# Patient Record
Sex: MALE | Race: WHITE | Employment: OTHER | URBAN - METROPOLITAN AREA
[De-identification: names, ages, dates, MRNs, and addresses within clinical notes are randomized per-mention and may not be internally consistent; named-entity substitution may affect disease eponyms.]

---

## 2018-01-29 ENCOUNTER — TRANSFERRED RECORDS (OUTPATIENT)
Dept: HEALTH INFORMATION MANAGEMENT | Facility: CLINIC | Age: 58
End: 2018-01-29

## 2018-03-08 ENCOUNTER — TRANSFERRED RECORDS (OUTPATIENT)
Dept: HEALTH INFORMATION MANAGEMENT | Facility: CLINIC | Age: 58
End: 2018-03-08

## 2018-04-23 DIAGNOSIS — J44.9 COPD (CHRONIC OBSTRUCTIVE PULMONARY DISEASE) (H): Primary | ICD-10-CM

## 2018-05-22 NOTE — TELEPHONE ENCOUNTER
FUTURE VISIT INFORMATION      FUTURE VISIT INFORMATION:    Date: 6.7.18    Time: 3:00 Pm    Location: [facility]  REFERRAL INFORMATION:    Referring provider:  Shelly Lennox    Referring providers clinic:  [facility]    Reason for visit/diagnosis : COPD, consult for possible volume lung reduction    RECORDS REQUESTED FROM:       Clinic name Comments Records Status Imaging Status   Formerly [facility] ([facility])  Received PACS                                   RECORDS STATUS      RECORDS RECEIVED FROM: [facility]   DATE RECEIVED: 6.7.18   NOTES STATUS DETAILS   OFFICE NOTE from referring provider Received 4.10.18 Shelley Lennox  3.1.18   OFFICE NOTE from other specialist N/A    DISCHARGE SUMMARY from hospital Received [facility]  1.16.18-1.19.18   DISCHARGE REPORT from the ER N/A    OPERATIVE REPORT N/A    MEDICATION LIST Received    IMAGING  (NEED IMAGES AND REPORTS)     CT SCAN In process, requested image 3.8.18 [facility]   CHEST XRAY (CXR) In process, requested image 1.29.18  Scheduled for 6.7.18   TESTS     PULMONARY FUNCTION TESTING (PFT) In process 2.27.18 [facility]  Scheduled for 6.7.18   FLOW LOOP VOLUME (FVL) In process Scheduled for 6.7.18   CYSTIC FIBROSIS     CF SPUTUM CULTURE N/A       **6.1.18 2nd request for images

## 2018-06-07 ENCOUNTER — RADIANT APPOINTMENT (OUTPATIENT)
Dept: GENERAL RADIOLOGY | Facility: CLINIC | Age: 58
End: 2018-06-07
Payer: COMMERCIAL

## 2018-06-07 ENCOUNTER — PRE VISIT (OUTPATIENT)
Dept: PULMONOLOGY | Facility: CLINIC | Age: 58
End: 2018-06-07

## 2018-06-07 ENCOUNTER — OFFICE VISIT (OUTPATIENT)
Dept: PULMONOLOGY | Facility: CLINIC | Age: 58
End: 2018-06-07
Attending: INTERNAL MEDICINE
Payer: COMMERCIAL

## 2018-06-07 VITALS
RESPIRATION RATE: 16 BRPM | BODY MASS INDEX: 16.71 KG/M2 | HEIGHT: 69 IN | OXYGEN SATURATION: 99 % | HEART RATE: 90 BPM | SYSTOLIC BLOOD PRESSURE: 94 MMHG | DIASTOLIC BLOOD PRESSURE: 68 MMHG | WEIGHT: 112.8 LBS

## 2018-06-07 DIAGNOSIS — R09.02 HYPOXIA: ICD-10-CM

## 2018-06-07 DIAGNOSIS — J44.9 COPD (CHRONIC OBSTRUCTIVE PULMONARY DISEASE) (H): ICD-10-CM

## 2018-06-07 DIAGNOSIS — R63.6 UNDERWEIGHT: ICD-10-CM

## 2018-06-07 DIAGNOSIS — J44.9 COPD, VERY SEVERE (H): Primary | ICD-10-CM

## 2018-06-07 PROCEDURE — 93005 ELECTROCARDIOGRAM TRACING: CPT | Mod: ZF

## 2018-06-07 PROCEDURE — 93010 ELECTROCARDIOGRAM REPORT: CPT | Mod: ZP | Performed by: INTERNAL MEDICINE

## 2018-06-07 PROCEDURE — G0463 HOSPITAL OUTPT CLINIC VISIT: HCPCS | Mod: 25,ZF

## 2018-06-07 RX ORDER — AZITHROMYCIN 250 MG/1
250 TABLET, FILM COATED ORAL DAILY
Qty: 30 TABLET | Refills: 3 | Status: SHIPPED | OUTPATIENT
Start: 2018-06-07

## 2018-06-07 RX ORDER — HYDROCODONE BITARTRATE AND ACETAMINOPHEN 7.5; 325 MG/1; MG/1
TABLET ORAL
COMMUNITY
Start: 2017-01-30 | End: 2018-09-06

## 2018-06-07 RX ORDER — ALBUTEROL SULFATE 0.83 MG/ML
2.5 SOLUTION RESPIRATORY (INHALATION)
COMMUNITY

## 2018-06-07 RX ORDER — ASPIRIN 325 MG
325 TABLET ORAL
COMMUNITY

## 2018-06-07 RX ORDER — ALBUTEROL SULFATE 90 UG/1
2 AEROSOL, METERED RESPIRATORY (INHALATION)
COMMUNITY
Start: 2017-06-26 | End: 2018-06-26

## 2018-06-07 RX ORDER — IBUPROFEN 200 MG
200-600 TABLET ORAL
COMMUNITY

## 2018-06-07 RX ORDER — ALBUTEROL SULFATE 1.25 MG/3ML
1.25 SOLUTION RESPIRATORY (INHALATION)
COMMUNITY
End: 2018-09-06

## 2018-06-07 RX ORDER — FLUDROCORTISONE ACETATE 0.1 MG/1
0.1 TABLET ORAL
COMMUNITY
Start: 2018-05-17 | End: 2019-05-17

## 2018-06-07 RX ORDER — TRAMADOL HYDROCHLORIDE 50 MG/1
50 TABLET ORAL
COMMUNITY
Start: 2018-05-17

## 2018-06-07 RX ORDER — BUDESONIDE AND FORMOTEROL FUMARATE DIHYDRATE 160; 4.5 UG/1; UG/1
2 AEROSOL RESPIRATORY (INHALATION)
COMMUNITY
Start: 2018-03-22 | End: 2018-09-06

## 2018-06-07 RX ORDER — PREDNISONE 5 MG/1
TABLET ORAL
COMMUNITY
Start: 2018-03-01

## 2018-06-07 RX ORDER — TIOTROPIUM BROMIDE 18 UG/1
18 CAPSULE ORAL; RESPIRATORY (INHALATION)
COMMUNITY
Start: 2017-09-01 | End: 2018-09-06

## 2018-06-07 ASSESSMENT — PAIN SCALES - GENERAL: PAINLEVEL: SEVERE PAIN (7)

## 2018-06-07 NOTE — LETTER
"6/7/2018       RE: Zac Peterson  [address] [facility] [address]     Dear Colleague,    Thank you for referring your patient, Zac Peterson, to the [facility] at [facility]. Please see a copy of my visit note below.    Reason for Visit  Zac Peterson is a 57 year old year old male with PMH of COPD on home O2, brain aneurysms s/p stenting, and hypotension on fludrocortisone who was referred for consideration of lung reduction surgery.   Pulmonary HPI  Patient reports initial diagnosis of COPD around 2004/2005 with initial prescription for Ventolin. Reports receiving Advair around [phone] along with start of oxygen therapy. Advair changed to Spiriva in 2011 when disease getting worse and in 2013 also started on Symbicort. Currently is mostly homebound due to severity of his symptoms. If leaving house will wear oxygen (3L) and also wears 2L to sleep. Becomes short of breath with dressing, showering, and eating. Has started to wear his oxygen in the shower and eats once per day with 40+ lb weight loss. At least once per day so short of breath that will panic. Having \"attacks\" of his breathing more and more often with wheezing and extreme dyspnea. Does cough but this not major part of symptoms. If having symptoms will spend up to 15 minutes doing purse-lip breathing and if no improving his fiancee will prepare to take him to the ED. Has been hospitalized multiple times this year including in January for Influenza. Remains on Symbicort, Spiriva, albuterol, and several weeks ago started on daily 5 mg prednisone with some improvement in symptoms.     Smoked for 42 years, 1 ppd, before quitting just over one year ago. 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 Wore a respirator but reports being exposed to a lot of fumes. Fell in 2007 with bilateral crushed heels and has been on " "SSI since. Reports living in new house, no mold, dust but fiancee vacuums frequently. Does have multiple animals in the house including dogs, sugar-gliders, and pythons which he breeds.  Has lived in [place] since 2017, moved here for his fiancee's job. Prior was living for a long time in [place] and then briefly in [place]. Recently being seen by Dr. Lennox at [facility].    Other recent health issues of transient left sided vision loss that worked up extensively without clear cause. Mesenteric stranding seen on a CT with concern for malignancy but EGD and colonoscopy normal.    Per Care Everywhere:    Medications:  Albuterol, prednisone, Symbicort, Spiriva, fludrocortisone, tramadol, ASA    Allergies: No known allergies    Medical and Surgical History:  MI in   Brain aneurysm with stenting  Surgery to feet  Hernia repair      Family History:  Parkinsonism in mother  Both parents     Social History     Marital status: Single     Spouse name: N/A     Number of children: N/A     Years of education: N/A     Occupational History     On SSI, prior  and      Social History Main Topics     Smoking status: Former     Smokeless tobacco: Not on file     Alcohol use Not on file     Drug use: Not on file     Sexual activity: Not on file     Review of Systems:  Constitutional: Weight loss, chills  HEENT: Intermittent left vision loss  Resp: See HPI  Cardio: No chest pain  GI: Decreased appetite  Neuro: Tingling of feet and hands  A complete ROS was otherwise negative except as noted in the HPI.  BP 94/68  Pulse 90  Resp 16  Ht 1.753 m (5' 9\")  Wt 51.2 kg (112 lb 12.8 oz)  SpO2 99%  BMI 16.66 kg/m2  Exam:   GENERAL APPEARANCE: Cachectic frail male, alert, no apparent distress  HEENT: PERRL, face symmetric, temporal wasting, oral mucosa without lesions, NC in place  NECK: Supple, no masses, no thyromegaly  LYMPHATICS: No significant axillary, cervical, or supraclavicular nodes  RESP: " Breath sounds diminished particularly in bases, no wheezing or crackles, non-labored speech on 3L  CV: Normal S1, S2, regular rhythm and rate without murmur  ABDOMEN:  Bowel sounds normal, soft, nontender, no HSM or masses  MS: Clubbing of fingers, no edema  SKIN: No rash on limited exam  NEURO: Mentation intact, speech normal, normal gait  PSYCH: Stable mood  Results:  Recent Results (from the past 168 hour(s))   Pulmonary function test    Collection Time: 06/07/18  1:39 PM   Result Value Ref Range    FVC-Pred 4.53 L    FVC-Pre 2.27 L    FVC-%Pred-Pre 50 %    FEV1-Pre 0.94 L    FEV1-%Pred-Pre 26 %    FEV1FVC-Pred 76 %    FEV1FVC-Pre 42 %    FEFMax-Pred 9.09 L/sec    FEFMax-Pre 2.29 L/sec    FEFMax-%Pred-Pre 25 %    FEF2575-Pred 2.99 L/sec    FEF2575-Pre 0.31 L/sec    QXX6158-%Pred-Pre 10 %    FEF2575-Post 0.37 L/sec    XCH5267-%Pred-Post 12 %    ExpTime-Pre 11.07 sec    FIFMax-Pre 2.34 L/sec    VC-Pred 4.85 L    VC-Pre 2.54 L    VC-%Pred-Pre 52 %    IC-Pred 2.91 L    IC-Pre 1.46 L    IC-%Pred-Pre 50 %    ERV-Pred 1.94 L    ERV-Pre 1.08 L    ERV-%Pred-Pre 55 %    FEV1FEV6-Pred 79 %    FEV1FEV6-Pre 49 %    DLCOunc-Pred 28.31 ml/min/mmHg    DLCOunc-Pre 9.26 ml/min/mmHg    DLCOunc-%Pred-Pre 32 %    VA-Pre 4.21 L    VA-%Pred-Pre 63 %    FEV1SVC-Pred 73 %    FEV1SVC-Pre 37 %   EKG 12-lead, tracing only    Collection Time: 06/07/18  3:06 PM   Result Value Ref Range    Interpretation ECG Click View Image link to view waveform and result        Assessment and Plan:  Zac Peterson is a 57 year old year old male with PMH of COPD on home O2, brain aneurysms s/p stenting, and hypotension on fludrocortisone who was referred for consideration of lung reduction surgery. Patient reports increasing frequent exacerbations and overall very poor quality of life and not able to do any sort of activity without oxygen. PFTs today with very severe obstruction (FEV1 26%) despite extensive therapy of Spiriva, Symbicort, Albuterol, daily  prednisone, and home oxygen. Could benefit from anti-inflammatory properties of chronic azithromycin given frequent exacerbations and will prescribe. Ultimately patient has reached end stage disease and next steps would be either lung volume reduction surgery or lung transplant. Discussed that emphysema is diffuse which likely limits the benefit of lung volume reduction surgery. Discussed options of referral to [facility] for definite opinion on surgical lung reduction surgery vs consultation with IP colleagues for pseudo-LVRS using valves  and/or referral for lung transplant evaluation. At this time patient would like to proceed with transplant evaluation.  Also agreeable to having IP review chest CTs to assess candidacy for LVRS.  - Referral for lung transplant evaluation, phone message left with coordinator  - Will review imaging with Interventional Pulmonology to see if any role for endobronchial valve placement +/- referral onto [facility] for surgical LVRS assessment  - Azithromycin 250 mg  prescription for MWF use, EKG in clinic with QTc 440  - Follow up in 3 months, instructed to call with any exacerbations or decline in symptoms    Patient seen and discussed with Dr. Gerardo Faustin MD PhD  Internal Medicine PGY-3  [phone]    Physician Attestation   I, Elsie Carrillo, saw and discussed this patient with the resident/fellow.  I agree with the resident/fellow findings and plan of care as documented in their note. I have personally reviewed today's vital signs, medications, laboratory results and imaging results.    Elsie Carrillo  Date of Service (when I saw the patient): Jun 7, 2018        Again, thank you for allowing me to participate in the care of your patient.      Sincerely,    Elsie Carrillo MD

## 2018-06-07 NOTE — MR AVS SNAPSHOT
After Visit Summary   6/7/2018    Zac Peterson    MRN: [id number]           Patient Information     Date Of Birth          1960        Visit Information        Provider Department      6/7/2018 3:00 PM Elsie Carrillo MD [facility]        Today's Diagnoses     COPD, very severe (H)    -  1       Follow-ups after your visit        Follow-up notes from your care team     Return in about 3 months (around 9/7/2018).      Your next 10 appointments already scheduled     Sep 06, 2018 10:30 AM CDT   (Arrive by 10:15 AM)   Return Visit with Elsie Carrillo MD   [facility] ([facility])    [address] [facility]  [address] [place] [id number] [phone]              Who to contact     If you have questions or need follow up information about today's clinic visit or your schedule please contact [facility] directly at [phone].  Normal or non-critical lab and imaging results will be communicated to you by Manaltohart, letter or phone within 4 business days after the clinic has received the results. If you do not hear from us within 7 days, please contact the clinic through [employer] or phone. If you have a critical or abnormal lab result, we will notify you by phone as soon as possible.  Submit refill requests through [employer] or call your pharmacy and they will forward the refill request to us. Please allow 3 business days for your refill to be completed.          Additional Information About Your Visit        MyChart Information     [employer] gives you secure access to your electronic health record. If you see a primary care provider, you can also send messages to your care team and make appointments. If you have questions, please call your primary care clinic.  If you do not have a primary care provider, please call [phone] and they will assist you.        Care  "EveryWhere ID     This is your Care EveryWhere ID. This could be used by other organizations to access your [facility] medical records  [id number]        Your Vitals Were     Pulse Respirations Height Pulse Oximetry BMI (Body Mass Index)       90 16 1.753 m (5' 9\") 99% 16.66 kg/m2        Blood Pressure from Last 3 Encounters:   06/07/18 94/68    Weight from Last 3 Encounters:   06/07/18 51.2 kg (112 lb 12.8 oz)              We Performed the Following     EKG 12-lead, tracing only          Today's Medication Changes          These changes are accurate as of 6/7/18  4:05 PM.  If you have any questions, ask your nurse or doctor.               Start taking these medicines.        Dose/Directions    azithromycin 250 MG tablet   Commonly known as:  ZITHROMAX   Used for:  COPD, very severe (H)   Started by:  Elsie Carrillo MD        Dose:  250 mg   Take 1 tablet (250 mg) by mouth daily   Quantity:  30 tablet   Refills:  3            Where to get your medicines      These medications were sent to [facility] [address] [place] [address] [address] [place] [address]  [address] [place] [address]     Phone:  [phone]     azithromycin 250 MG tablet               Information about OPIOIDS     PRESCRIPTION OPIOIDS: WHAT YOU NEED TO KNOW   You have a prescription for an opioid (narcotic) pain medicine. Opioids can cause addiction. If you have a history of chemical dependency of any type, you are at a higher risk of becoming addicted to opioids. Only take this medicine after all other options have been tried. Take it for as short a time and as few doses as possible.     Do not:    Drive. If you drive while taking these medicines, you could be arrested for driving under the influence (DUI).    Operate heavy machinery    Do any other dangerous activities while taking these medicines.     Drink any alcohol while taking these medicines.      Take with any other medicines that contain acetaminophen. Read all labels carefully. " Look for the word  acetaminophen  or  Tylenol.  Ask your pharmacist if you have questions or are unsure.    Store your pills in a secure place, locked if possible. We will not replace any lost or stolen medicine. If you don t finish your medicine, please throw away (dispose) as directed by your pharmacist. The [place] Pollution Control Agency has more information about safe disposal: https://www.pca.Pending sale to [facility].[place]/living-green/managing-unwanted-medications    All opioids tend to cause constipation. Drink plenty of water and eat foods that have a lot of fiber, such as fruits, vegetables, prune juice, apple juice and high-fiber cereal. Take a laxative (Miralax, milk of magnesia, Colace, Senna) if you don t move your bowels at least every other day.          Primary Care Provider Office Phone # Fax #    Radhames Bowser  [phone]       [facility] [address]  [facility] [address]        Equal Access to Services     JABIER WHITLEY : Hadii nydia ku hadasho Soomaali, waaxda luqadaha, qaybta kaalmada adeegyada, john de paz . So [facility] [phone].    ATENCIÓN: Si habla español, tiene a haji disposición servicios gratuitos de asistencia lingüística. Llame al [phone].    We comply with applicable federal civil rights laws and [place] laws. We do not discriminate on the basis of race, color, national origin, age, disability, sex, sexual orientation, or gender identity.            Thank you!     Thank you for choosing [facility]  for your care. Our goal is always to provide you with excellent care. Hearing back from our patients is one way we can continue to improve our services. Please take a few minutes to complete the written survey that you may receive in the mail after your visit with us. Thank you!             Your Updated Medication List - Protect others around you: Learn how to safely use, store and throw away your  medicines at www.disposemymeds.org.          This list is accurate as of 6/7/18  4:05 PM.  Always use your most recent med list.                   Brand Name Dispense Instructions for use Diagnosis    * albuterol (2.5 MG/3ML) 0.083% neb solution      2.5 mg by Intrapulmonary route        * albuterol 1.25 MG/3ML nebulizer solution    ACCUNEB     Inhale 1.25 mg into the lungs        * albuterol 108 (90 Base) MCG/ACT Inhaler    PROAIR HFA/PROVENTIL HFA/VENTOLIN HFA     Inhale 2 puffs into the lungs        aspirin 325 MG tablet      Take 325 mg by mouth        azithromycin 250 MG tablet    ZITHROMAX    30 tablet    Take 1 tablet (250 mg) by mouth daily    COPD, very severe (H)       budesonide-formoterol 160-4.5 MCG/ACT Inhaler    SYMBICORT     Inhale 2 puffs into the lungs        fludrocortisone 0.1 MG tablet    FLORINEF     Take 0.1 mg by mouth        HYDROcodone-acetaminophen 7.5-325 MG per tablet    NORCO     Take 1-2 tab by mouth every 4 hours as needed        ibuprofen 200 MG tablet    ADVIL/MOTRIN     Take 200-600 mg by mouth        predniSONE 5 MG tablet    DELTASONE     Take one tab daily.        SPIRIVA HANDIHALER 18 MCG capsule   Generic drug:  tiotropium      Inhale 18 mcg into the lungs        traMADol 50 MG tablet    ULTRAM     Take 50 mg by mouth        * Notice:  This list has 3 medication(s) that are the same as other medications prescribed for you. Read the directions carefully, and ask your doctor or other care provider to review them with you.

## 2018-06-07 NOTE — NURSING NOTE
Chief Complaint   Patient presents with     Consult     COPD, consult for possible volume lung reduction     Jose D Batista

## 2018-06-07 NOTE — PROGRESS NOTES
"Reason for Visit  Zac Peterson is a 57 year old year old male with PMH of COPD on home O2, brain aneurysms s/p stenting, and hypotension on fludrocortisone who was referred for consideration of lung reduction surgery.   Pulmonary HPI  Patient reports initial diagnosis of COPD around 2004/2005 with initial prescription for Ventolin. Reports receiving Advair around [id number] along with start of oxygen therapy. Advair changed to Spiriva in 2011 when disease getting worse and in 2013 also started on Symbicort. Currently is mostly homebound due to severity of his symptoms. If leaving house will wear oxygen (3L) and also wears 2L to sleep. Becomes short of breath with dressing, showering, and eating. Has started to wear his oxygen in the shower and eats once per day with 40+ lb weight loss. At least once per day so short of breath that will panic. Having \"attacks\" of his breathing more and more often with wheezing and extreme dyspnea. Does cough but this not major part of symptoms. If having symptoms will spend up to 15 minutes doing purse-lip breathing and if no improving his christiane will prepare to take him to the ED. Has been hospitalized multiple times this year including in January for Influenza. Remains on Symbicort, Spiriva, albuterol, and several weeks ago started on daily 5 mg prednisone with some improvement in symptoms.     Smoked for 42 years, 1 ppd, before quitting just over one year ago. 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 Wore a respirator but reports being exposed to a lot of fumes. Fell in 2007 with bilateral crushed heels and has been on SSI since. Reports living in [facility], no mold, dust but fiancee vacuums frequently. Does have multiple animals in the house including dogs, sugar-gliders, and pythons which he breeds.  Has lived in [place] since Sept 2017, moved here for his fiancee's job. Prior was living for a long time in " "[place] and then briefly in [place]. Recently being seen by Dr. Lennox at [facility].    Other recent health issues of transient left sided vision loss that worked up extensively without clear cause. Mesenteric stranding seen on a CT with concern for malignancy but EGD and colonoscopy normal.    Per Care Everywhere:    Medications:  Albuterol, prednisone, Symbicort, Spiriva, fludrocortisone, tramadol, ASA    Allergies: No known allergies    Medical and Surgical History:  MI in 1970  Brain aneurysm with stenting  Surgery to feet  Hernia repair      Family History:  Parkinsonism in mother  Both parents     Social History     Marital status: Single     Spouse name: N/A     Number of children: N/A     Years of education: N/A     Occupational History     On SSI, prior  and      Social History Main Topics     Smoking status: Former     Smokeless tobacco: Not on file     Alcohol use Not on file     Drug use: Not on file     Sexual activity: Not on file     Review of Systems:  Constitutional: Weight loss, chills  HEENT: Intermittent left vision loss  Resp: See HPI  Cardio: No chest pain  GI: Decreased appetite  Neuro: Tingling of feet and hands  A complete ROS was otherwise negative except as noted in the HPI.  BP 94/68  Pulse 90  Resp 16  Ht 1.753 m (5' 9\")  Wt 51.2 kg (112 lb 12.8 oz)  SpO2 99%  BMI 16.66 kg/m2  Exam:   GENERAL APPEARANCE: Cachectic frail male, alert, no apparent distress  HEENT: PERRL, face symmetric, temporal wasting, oral mucosa without lesions, NC in place  NECK: Supple, no masses, no thyromegaly  LYMPHATICS: No significant axillary, cervical, or supraclavicular nodes  RESP: Breath sounds diminished particularly in bases, no wheezing or crackles, non-labored speech on 3L  CV: Normal S1, S2, regular rhythm and rate without murmur  ABDOMEN:  Bowel sounds normal, soft, nontender, no HSM or masses  MS: Clubbing of fingers, no edema  SKIN: No rash on limited exam  NEURO: " Mentation intact, speech normal, normal gait  PSYCH: Stable mood  Results:  Recent Results (from the past 168 hour(s))   Pulmonary function test    Collection Time: 06/07/18  1:39 PM   Result Value Ref Range    FVC-Pred 4.53 L    FVC-Pre 2.27 L    FVC-%Pred-Pre 50 %    FEV1-Pre 0.94 L    FEV1-%Pred-Pre 26 %    FEV1FVC-Pred 76 %    FEV1FVC-Pre 42 %    FEFMax-Pred 9.09 L/sec    FEFMax-Pre 2.29 L/sec    FEFMax-%Pred-Pre 25 %    FEF2575-Pred 2.99 L/sec    FEF2575-Pre 0.31 L/sec    CXI3202-%Pred-Pre 10 %    FEF2575-Post 0.37 L/sec    SFS9540-%Pred-Post 12 %    ExpTime-Pre 11.07 sec    FIFMax-Pre 2.34 L/sec    VC-Pred 4.85 L    VC-Pre 2.54 L    VC-%Pred-Pre 52 %    IC-Pred 2.91 L    IC-Pre 1.46 L    IC-%Pred-Pre 50 %    ERV-Pred 1.94 L    ERV-Pre 1.08 L    ERV-%Pred-Pre 55 %    FEV1FEV6-Pred 79 %    FEV1FEV6-Pre 49 %    DLCOunc-Pred 28.31 ml/min/mmHg    DLCOunc-Pre 9.26 ml/min/mmHg    DLCOunc-%Pred-Pre 32 %    VA-Pre 4.21 L    VA-%Pred-Pre 63 %    FEV1SVC-Pred 73 %    FEV1SVC-Pre 37 %   EKG 12-lead, tracing only    Collection Time: 06/07/18  3:06 PM   Result Value Ref Range    Interpretation ECG Click View Image link to view waveform and result        Assessment and Plan:  Zac Peterson is a 57 year old year old male with PMH of COPD on home O2, brain aneurysms s/p stenting, and hypotension on fludrocortisone who was referred for consideration of lung reduction surgery. Patient reports increasing frequent exacerbations and overall very poor quality of life and not able to do any sort of activity without oxygen. PFTs today with very severe obstruction (FEV1 26%) despite extensive therapy of Spiriva, Symbicort, Albuterol, daily prednisone, and home oxygen. Could benefit from anti-inflammatory properties of chronic azithromycin given frequent exacerbations and will prescribe. Ultimately patient has reached end stage disease and next steps would be either lung volume reduction surgery or lung transplant. Discussed that  emphysema is diffuse which likely limits the benefit of lung volume reduction surgery. Discussed options of referral to [facility] for definite opinion on surgical lung reduction surgery vs consultation with IP colleagues for pseudo-LVRS using valves  and/or referral for lung transplant evaluation. At this time patient would like to proceed with transplant evaluation.  Also agreeable to having IP review chest CTs to assess candidacy for LVRS.  - Referral for lung transplant evaluation, phone message left with coordinator  - Will review imaging with Interventional Pulmonology to see if any role for endobronchial valve placement +/- referral onto [facility] for surgical LVRS assessment  - Azithromycin 250 mg  prescription for MWF use, EKG in clinic with QTc 440  - Follow up in 3 months, instructed to call with any exacerbations or decline in symptoms    Patient seen and discussed with Dr. Gerardo Faustin MD PhD  Internal Medicine PGY-3  [phone]    Physician Attestation   I, Elsie Carrillo, saw and discussed this patient with the resident/fellow.  I agree with the resident/fellow findings and plan of care as documented in their note. I have personally reviewed today's vital signs, medications, laboratory results and imaging results.    Elsie Carrillo  Date of Service (when I saw the patient): Jun 7, 2018

## 2018-06-08 PROBLEM — R09.02 HYPOXIA: Status: ACTIVE | Noted: 2018-06-08

## 2018-06-08 PROBLEM — J44.9 COPD, VERY SEVERE (H): Status: ACTIVE | Noted: 2018-06-08

## 2018-06-08 LAB — INTERPRETATION ECG - MUSE: NORMAL

## 2018-06-19 ENCOUNTER — REFERRAL (OUTPATIENT)
Dept: TRANSPLANT | Facility: CLINIC | Age: 58
End: 2018-06-19

## 2018-06-19 NOTE — LETTER
Zac Peterson  [address] [facility] [address]      Dear Zac,    Thank you for your interest in the Transplant Center at [facility], [facility]. We look forward to being a part of your care team and assisting you through the transplant process.    As we discussed, your transplant coordinator is Janel Navarrete (Lung).  You may call your coordinator at any time with questions or concerns.  Your first scheduled call will be on 7/25/18.  If this needs to change, call [phone].    Please complete the following.    1. Fill out and return the enclosed forms    Authorization for Electronic Communication    Authorization to Discuss Protected Health Information    [employer] Release of Information    2. Sign up for:    [employer], access to your electronic medical record (see enclosed pamphlet)    [employer], a transplant education website (see enclosed booklet)    You can use these tools to learn more about your transplant, communicate with your care team, and track your medical details      Sincerely,      Solid Organ Transplant  [facility], [facility]    cc: Referring Physician PCP

## 2018-07-10 ENCOUNTER — TELEPHONE (OUTPATIENT)
Dept: TRANSPLANT | Facility: CLINIC | Age: 58
End: 2018-07-10

## 2018-07-10 VITALS — BODY MASS INDEX: 16.03 KG/M2 | WEIGHT: 112 LBS | HEIGHT: 70 IN

## 2018-07-10 NOTE — TELEPHONE ENCOUNTER
Provider Call: General  Route to LPN    Reason for call: Pts referring providers office left  7/10/18 at 1026am. Pt is upset they have not heard back from the U from the referral end. Provider's office is following up and making sure the pt is being seen. Please return call to BOTH the pt ant the providers office.     Call back needed? Yes    Return Call Needed  Same as documented in contacts section  When to return call?: Greater than one day: Route standard priority

## 2018-07-10 NOTE — TELEPHONE ENCOUNTER
Called patient back to discuss concern he had not been called by intake pod to start lung transplant. Patient's referral established on 6/7/2018 with a follow up call on 06/20/2018. No contact was documented for either call. Alerted intake pod of patient and need for intake to be completed. Reviewed process with patient and informed him someone from the intake pod would follow up today 07/10/2018. Provided patient transplant coordinator's contact information and and number for intake pod in the event they were unable to reach him today. Patient awaiting intake call, reported he was eager to move forward with the transplant process.

## 2018-07-10 NOTE — TELEPHONE ENCOUNTER
Insurance information:   [employer]  Policy luna:  SELF  Subscriber/policy/ID number:  [id number]  Group Number:  [id number]    Health Maintenance:  Colon:  UTD  PSA:  UTD  Dental: FULL DENTURES  Vaccines:  UTD      Referral intake process completed.  Patient is aware that after financial approval is received, medical records will be requested.   Patient confirmed for a callback from transplant coordinator on 7/25/18.  Tentative evaluation date:   Confirmed coordinator will discuss evaluation process in more detail at the time of their call.   Patient is aware of the need to arrange age appropriate cancer screening, vaccinations, and dental care.  Reminded patient to complete questionnaire, complete medical records release, and review packet prior to evaluation visit .  Assessed patient for special needs (ie wheelchair, assistance, guardian, and ):  O2 @ 2-3 L cont via N/C   Patient instructed to call [phone] with questions.

## 2018-07-25 NOTE — TELEPHONE ENCOUNTER
"SOT LUNG INTAKE    July 25, 2018    Zac Peterson  [id number]  Referring Provider: Dr Lennox  Source/Facility: [facility]    Referral packet and welcome letter received? Yes, patient reported he has reviewed material and is in process of filling out needed paperwork.     Diagnosis: COPD  57 year old    (06/07/18)  Height: 5' 9\" Weight: 112lbs  BMI: 16.5  (09/06/18)  Height: 5'9\" Weight: 112lbs   BMI: 17.4  Discussed need for patient to have a BMI between 18-30 to move forward with a lung transplant evaluation. Reviewed concerns with post transplant outcomes related to low BMI. Encouraged patient to reach out to PCP for additional assistance with weight gain and encouraged patient to focus on consuming high protein/ good fats in her diet.     Social History:    Social History     Social History     Marital status: Single     Spouse name: N/A     Number of children: N/A     Years of education: N/A     Occupational History     Not on file.     Social History Main Topics     Smoking status: Former Smoker     Quit date: 1/1/2016     Smokeless tobacco: Never Used     Alcohol use Yes      Comment: Rarely     Drug use: No     Sexual activity: Not on file     Other Topics Concern     Not on file     Social History Narrative       Smoking History: 1 ppd/40 years started at age 15 y/o   Quit Date: Approximately 18 Months, Jan 2017 (Patient reported he quit smoking but then relapsed, has since quite again without relapse).   Tobacco Use: Patient denies   Quit date: n/a  Street Drug Use: Patient denies   Quit Date: n/a  ETOH Use: Patient denies   Quit Date: n/a  Second-hand Smoke exposure: Patient denies    Family History:    No family history on file.    Past Medical History  Pulmonary Manifestations date: Approximately 2005  Details: Patient noted he couldn't walk across large fields when hunting in winter. Noted to have chest pain and shortness of breath, was initially worked up for heart issues, testing was negative. "     Diabetes: No known history  Coronary Artery Disease: No known history  Hypertension: No known history, patient noted he had history of hypotension   Previous transfusion(s): No known history  History of Cancer: No known history   GERD: No known history  Bleeding Disorders: No known history    Other Past Medical History: Bilateral carotid stents for aneurysms.     Past Medical History:   Diagnosis Date     Arthritis 2008    feet     COPD (chronic obstructive pulmonary disease) (H) 2004    [facility]     Emphysema (subcutaneous) (surgical) resulting from a procedure      Emphysema lung (H)        Surgical History   Lung Biopsy: No  Pneumothorax: No  Chest Surgery: None    Past Surgical History:   Procedure Laterality Date     BIOPSY Right 2014    Breast       BREAST SURGERY Right     Biopsy     COLONOSCOPY  2018    [facility]     HEAD & NECK SURGERY      multiple head aneurysms [facility] in [place], [place]     HERNIA REPAIR  2008    [facility] in [place], [place]     ORTHOPEDIC SURGERY Bilateral 2013    Fused heels (9 surgeries total)       Mental Health History  Depression: Patient denies  Anxiety: Patient denies  Other: n/a    Medications  Current Outpatient Prescriptions   Medication     albuterol (2.5 MG/3ML) 0.083% neb solution     albuterol (ACCUNEB) 1.25 MG/3ML nebulizer solution     aspirin 325 MG tablet     azithromycin (ZITHROMAX) 250 MG tablet     budesonide-formoterol (SYMBICORT) 160-4.5 MCG/ACT Inhaler     fludrocortisone (FLORINEF) 0.1 MG tablet     predniSONE (DELTASONE) 5 MG tablet     tiotropium (SPIRIVA HANDIHALER) 18 MCG capsule     HYDROcodone-acetaminophen (NORCO) 7.5-325 MG per tablet     ibuprofen (ADVIL/MOTRIN) 200 MG tablet     traMADol (ULTRAM) 50 MG tablet     No current facility-administered medications for this visit.      Blood thinner hx: No  Prednisone hx: prednisone, 5 mg, daily    Antibiotic hx: azithromycin, 250mg, MWF  Narcotic hx:  Hydrocodone, Tramadol     Allergies  Review of patient's allergies indicates no known allergies.      Pulmonary Tests and Status  PFT's  Date: 06/07/2018   FVC 4.53/2.27 50%   FEV1 3.52/0.94 26%   DLCO 28.31/9.26 32%  Date: 02/27/2018   FVC 3.2 67%   FEV1 1.4L 40%  DLCO 9.5 36%    6 Minute Walk: 04/10/2018 Walked 522 ft on Room Air with Saturations 87-95%    Oxygen use: Yes  Date of O2 initiation: 2013   Rest: 2L pulse   Activity: 2L pulse   Sleep: 2L Inconsistent use   Sleep Study: No  BiPAP/CPAP: Patient denies use  Pulmonary Rehab: Yes, did not compete due to COPD exacerbation   Date: Unsure of dates, Several years ago Location: [place]    Current activity: Patient reports he has become more limited    Current activity:  Basic ADLs    Feeding: No concerns noted  Toileting : No concerns noted  Selecting proper attire: Not assessed at this time, phone interview  Grooming : No concerns noted  Maintaining continence : No concerns noted  Putting on clothing Notes some shortness of breath intermittently  Bathing: Notes shortness of breath, Uses O2 in shower to prevent SOB  Walking & Transferring: Notes shortness of breath with limited walking approximately at 100 yards will begin to feel SOB    Instrumental ADLs    Managing Finances : No concerns noted  Handling Transportation : No concerns noted  Shopping: Notes he is able to get groceries if he can use drivable cart notes he would not be able to do otherwise. Will bring in groceries, but must pace himself and take breaks to prevent si   Preparing meals : No concerns noted  Using telephone & communication devices : No concerns noted  Managing medications : No concerns noted  Housework & basic home maintenance Notes he is able to sweep, do laundry, but notices increase in SOB when trying to vaccum    Hospitalizations in prior 12 months  Date:  01/15/2018    Location: [facility]  Reason: COPD exacerbation/ Influenza A: treated with tamiflu, ceftriaxone,  azithromycin    Diagnostic Tests/Imaging  Heart cath:   Stress Test:   ECHO: 4/27/2016  Conclusions   Left ventricular ejection fraction is 65-70%   No wall motion abnormalities visualized.   Grade 1 diastolic dysfunction, relaxation abnormality.   Trace mitral and aortic regurgitation.   Right ventricular systolic pressure is estimated at 31 mmHg, suggesting   normal pulmonary pressures.    Chest CTA: 10/12/2018  FINDINGS:  ANGIOGRAM CHEST: Mildly enlarged central pulmonary arteries suggesting some degree of chronic pulmonary arterial hypertension. No evidence of acute pulmonary embolus. No thoracic aortic aneurysm/dissection.  CONCLUSION:  1.  Nondisplaced anterolateral right eighth rib fracture.  2.  Chest CTA negative for acute pulmonary embolus and thoracic aortic dissection/aneurysm.  3.  Severe bullous emphysema redemonstrated in a basilar predominance. No pneumothorax nor pleural effusion.  4.  Stable 1.5 x 1 cm right upper lobe nodule. Consider follow-up CT scans in March 2019, and March 2020.    PET/CT: 03/30/2018  CONCLUSION:  Irregular nodule in the right lung apex is not FDG avid, favoring a benign or indolent process. This most likely represents an area of focal scarring.    CT ABD PELVIS W IV CONTRAST 1/17/2018  CONCLUSION:  1.  The patient has extensive bullae in both lower lobes. This pattern is   frequent associated with alpha-1 antitrypsin deficiency. There are small   bilateral pleural effusions. There is peribronchial thickening in both   lower lobes.  2.  The gallbladder is not distended, there is a small amount of fluid   around the gallbladder. There is stranding in the mesentery of uncertain   significance. The differential would include carcinomatosis.  3.  There is a left inguinal hernia seen best on the coronal and sagittal   reconstructions. I cannot follow bowel loop into it, it this has fluid or   soft tissue density.    DEXA: Patient unsure if he has completed.   Upper GI: 1/25/2018  Scanned report Care Everywhere    IR Carotid Cerebral 5/31/2018 d/t patient report of temporary visual loss which has resolved. Patient has hx pf artery pipeline stents for aneurysms.   FINDINGS:  -Right common carotid artery angiography focused on the neck vasculature is normal. No stenosis is suggested at the right carotid bifurcation by NASCET criteria.  -Right internal carotid artery angiography  demonstrates 2 separate pipeline stents one within the distal petrous and proximal cavernous right internal carotid artery and the second within the supraclinoid segment of right internal carotid artery. No in-stent stenosis or other post stent complication is identified. No aneurysm is identified. The right hemispheric vasculature is otherwise unremarkable.  -Left common carotid artery angiography focused on the neck vasculature is normal. No stenosis is suggested left carotid bifurcation by NASCET criteria.  -Left internal carotid artery angiography  demonstrates a pipeline stent within the supraclinoid segment of the left internal carotid artery. No in-stent stenosis or other post stent complication is identified. No aneurysm is identified. The left ophthalmic artery appears to fill normally. The left hemispheric vasculature is otherwise unremarkable.  -Left vertebral artery angiography is unremarkable. No significant intracranial abnormalities are identified.  CONCLUSION:  Unremarkable diagnostic cerebral angiography in patient was previously undergone right and left internal carotid artery pipeline stent placement for intracranial aneurysm. No evidence of in-stent stenosis or other post stent complication identified. No intracranial aneurysms identified. No other significant intracranial or extracranial abnormalities are identified.      Primary Care  Health Maintenance   Topic Date Due     COPD ACTION PLAN Q1 YR  1960     PNEUMOVAX 1X HI RISK PATIENT < 65 (NO IB MSG)  09/29/1961     PHQ-2 Q1 YR  09/29/1971      TETANUS IMMUNIZATION (SYSTEM ASSIGNED)  09/29/1977     HIV SCREEN (SYSTEM ASSIGNED)  09/29/1977     HEPATITIS C SCREENING  09/29/1977     LIPID SCREEN Q5 YR MALE (SYSTEM ASSIGNED)  09/29/1994     COLON CANCER SCREEN (SYSTEM ASSIGNED)  09/29/2009     ADVANCE DIRECTIVE PLANNING Q5 YRS  09/29/2014     INFLUENZA VACCINE (1) 09/01/2018     SPIROMETRY ONETIME  Completed     PSA: No results found  Colonoscopy: [facility] 1/25/2019 No significant findings, 10 year follow up.   Dental: Will need   IMMUNIZATIONS: No records found in [facility]    Labs  A1AT: 2/27/2018 Normal Level: 112 (range ref: ).   Rheumatology: No known issues noted  Cystic Fibrosis: No known family Hx    Psycho-Social Assessment  Spouse/Significant Other/Partner: Naomi (significant other)    Location: [place], Lives with patient   Distance from [facility]: 35 Miles  Support System: No additional support noted, patient reported all of his children live out of state ([place])    Employment Status: Retired, on SSDI  Occupation:  (17 years)  Work history: , noted he worked as a  (20 years) prior to becoming a   Toxic Substance Exposure: Noted he was exposed to smoke in kitchen, oil, and boilers. Denies exposure to asbestos, lead, and excessive dust. Patient did note he grew up [place] a farm was exposed to pesticides as a child. Was exposed to epoxy, toluene, paint fumes when he was a .     Home Environment: Home, new construction. Patient denies exposure to lead, asbestos, dust, or mold in home.   Pets/Birds: 2 Dogs    Home Health care utilized: None    Financial Concerns: Nothing noted by patient at this time. Patient aware if he were to move forward with evaluation he would meet with  to discuss possible financial impact of lung transplant.     Notes:   Patient noted he had (3) aneurysms while living in [place], 4 stents placed.     Patient noted he has lost vision in his eye on several occasions, thought  to be loss of blood to eye, MDs assessed concerns his cerebral stents were possibly closed - noted to be ok.      Seen by Dr Carrillo for LVRS- declined    Plan: NPT with Dr Wilkerson 9/11/2018 with PFT/6MW/Dietician.     Concerns:   Limited social support   BMI less than 18 - Reports 45lb weight loss over 18 month period  Neurological hx of aneurysms with multiple stent placement  Bilateral Heels - Degenerative findings/ pain  Stranding in his mesentary concerning for carcinomatosis (per Dr Lennox note), EGD and colonoscopy were normal.    Inconsistent O2 use/ compliance

## 2018-08-15 ENCOUNTER — TELEPHONE (OUTPATIENT)
Dept: TRANSPLANT | Facility: CLINIC | Age: 58
End: 2018-08-15

## 2018-08-15 NOTE — TELEPHONE ENCOUNTER
Provider Call: General  Route to LPN    Reason for call: Pts  is seeking an up to date transplant status, if the initial call has been made, any adherence concern, an current/UTD follow up plan for pt. Please return call when available     Call back needed? Yes    Return Call Needed  Same as documented in contacts section  When to return call?: Greater than one day: Route standard priority

## 2018-08-20 NOTE — TELEPHONE ENCOUNTER
Returned call to , left detailed message regarding NPT visit and testing scheduled for 9/11/2018. Concerns noted during intake (neurological history of multiple aneurysms with stents, BMI less than 18, limited social support). Provided  with transplant coordinators contact information.

## 2018-09-05 ENCOUNTER — PATIENT OUTREACH (OUTPATIENT)
Dept: CARE COORDINATION | Facility: CLINIC | Age: 58
End: 2018-09-05

## 2018-09-06 ENCOUNTER — TELEPHONE (OUTPATIENT)
Dept: TRANSPLANT | Facility: CLINIC | Age: 58
End: 2018-09-06

## 2018-09-06 ENCOUNTER — OFFICE VISIT (OUTPATIENT)
Dept: PULMONOLOGY | Facility: CLINIC | Age: 58
End: 2018-09-06
Attending: INTERNAL MEDICINE
Payer: COMMERCIAL

## 2018-09-06 VITALS
SYSTOLIC BLOOD PRESSURE: 106 MMHG | OXYGEN SATURATION: 97 % | WEIGHT: 118 LBS | RESPIRATION RATE: 17 BRPM | HEART RATE: 95 BPM | DIASTOLIC BLOOD PRESSURE: 71 MMHG | BODY MASS INDEX: 16.89 KG/M2 | HEIGHT: 70 IN

## 2018-09-06 DIAGNOSIS — J43.2 CENTRILOBULAR EMPHYSEMA (H): Primary | ICD-10-CM

## 2018-09-06 DIAGNOSIS — R09.02 HYPOXIA: ICD-10-CM

## 2018-09-06 DIAGNOSIS — Z76.82 LUNG TRANSPLANT CANDIDATE: ICD-10-CM

## 2018-09-06 DIAGNOSIS — J44.9 COPD (CHRONIC OBSTRUCTIVE PULMONARY DISEASE) (H): Primary | ICD-10-CM

## 2018-09-06 PROCEDURE — G0463 HOSPITAL OUTPT CLINIC VISIT: HCPCS | Mod: ZF

## 2018-09-06 RX ORDER — ALBUTEROL SULFATE 90 UG/1
2 AEROSOL, METERED RESPIRATORY (INHALATION) EVERY 6 HOURS PRN
Qty: 3 INHALER | Refills: 1 | Status: SHIPPED | OUTPATIENT
Start: 2018-09-06

## 2018-09-06 RX ORDER — TIOTROPIUM BROMIDE 18 UG/1
18 CAPSULE ORAL; RESPIRATORY (INHALATION) DAILY
Qty: 30 CAPSULE | Refills: 3 | Status: SHIPPED | OUTPATIENT
Start: 2018-09-06

## 2018-09-06 RX ORDER — BUDESONIDE AND FORMOTEROL FUMARATE DIHYDRATE 160; 4.5 UG/1; UG/1
2 AEROSOL RESPIRATORY (INHALATION) 2 TIMES DAILY
Qty: 3 INHALER | Refills: 3 | Status: SHIPPED | OUTPATIENT
Start: 2018-09-06

## 2018-09-06 ASSESSMENT — PAIN SCALES - GENERAL: PAINLEVEL: NO PAIN (0)

## 2018-09-06 NOTE — PROGRESS NOTES
Pulmonary Clinic Return Visit  HPI:  Zac ePterson is a 57 year old year old male with PMH of COPD on home O2, brain aneurysms s/p stenting, and hypotension on fludrocortisone who returns to clinic for follow up.  To briefly review, he was diagnosed of COPD around  and has been maintained on Spiriva, high-dose Symbicort, albuterol, and chronic prednisone. At our last visit we started MWF azithromycin.  He returns to clinic today for follow-up.  At his last appointment we recommended a referral for transplant consideration.  It seems that this is still an process although concerns were expressed regarding his low BMI.  He denies any flares or hospitalizations since we last visited.  His prednisone was recently increased to 10 mg daily in an effort to increase his appetite to promote weight gain.  He plans to follow-up with his primary care provider tomorrow and inquire as to a prescription for an appetite stimulant.  He has met with the dietitian and is not taking Ensure 3 times daily.  At present he is able to walk about  feet before he would need to stop and rest.  He is using albuterol 3-4 times a day for shortness of breath.  He remains mostly homebound due to severity of his symptoms. If leaving house will wear oxygen (3L) and also wears 2L to sleep. Becomes short of breath with dressing, showering, and eating. Is having increased episodes of anxiety related to dyspnea.    Smoked for 42 years, 1 ppd, before quitting just over one year ago.         Medications:  Albuterol, prednisone, Symbicort, Spiriva, fludrocortisone, tramadol, ASA    Allergies: No known allergies    Medical and Surgical History:  MI in 1970  Brain aneurysm with stenting  Surgery to feet  Hernia repair      Family History:  Parkinsonism in mother  Both parents     Social History     Marital status: Single     Spouse name: N/A     Number of children: N/A     Years of education: N/A     Occupational History     [employer],  "prior  and      Social History Main Topics     Smoking status: Former     Smokeless tobacco: Not on file     Alcohol use Not on file     Drug use: Not on file     Sexual activity: Not on file     Review of Systems:  A complete ROS was otherwise negative except as noted in the HPI.    Exam:   /71  Pulse 95  Resp 17  Ht 1.778 m (5' 10\")  Wt 53.5 kg (118 lb)  SpO2 97%  BMI 16.93 kg/m2  GENERAL APPEARANCE: Cachectic frail male, alert, no apparent distress  HEENT: PERRL, face symmetric, temporal wasting, oral mucosa without lesions, NC in place  NECK: Supple, no masses, no thyromegaly  LYMPHATICS: No significant axillary, cervical, or supraclavicular nodes  RESP: Breath sounds diminished particularly in bases, no wheezing or crackles, non-labored speech on 3L  CV: Normal S1, S2, regular rhythm and rate without murmur  ABDOMEN:  Bowel sounds normal, soft, nontender, no HSM or masses  MS: Clubbing of fingers, no edema  SKIN: No rash on limited exam  NEURO: Mentation intact, speech normal, normal gait  PSYCH: Stable mood    Assessment and Plan:  Zac Peterson is a 57 year old year old male with PMH of COPD on home O2, brain aneurysms s/p stenting, and hypotension on fludrocortisone who presents to pulmonary clinic today for follow up of COPD.  Since our last visit, he has remained relatively stable on Spiriva, Symbicort, Albuterol, MWF Azithromycin, daily prednisone, and home oxygen. Prednisone has been recently increased to 10 mg daily for appetite stimulation.   I would not recommend any changes to his inhaler regimen at this time. He remains interested in moving foward in the transplant evaluation process which is very reasonable.  I will follow up with the transplant team to see where things are at. If he is not a transplant candidate, he could still be a candidate for LVRS.  We will obtain a VQ scan to better assess lung function and have this reviewed by our IP/CVTS group to better determine " candidacy.  If he is not a candidate for either transplant or LVRS, then palliative care referral would be very reasonable to help with symptom management and transitions toward end of life cares.    The above findings and plan were discussed with Mr. Peterson who voiced understanding.  Questions and concerns were answered to his satisfaction.      He would be a candidate for Prevnar and Pneumovax if not already received.    Return to clinic 3 months.    Manju Carrillo MD  Pulmonary and Critical Care Medicine    I spent a total of 30 minutes face to face with Zac Peterson during today's office visit. Over 50% of this time was spent counseling the patient and/or coordinating care regarding their pulmonary disease.

## 2018-09-06 NOTE — TELEPHONE ENCOUNTER
Called patient to confirm appointments for transplant pulmonologist on 9/11/2018 with Dr Wilkerson at 940AM. Patient aware additional testing to be completed prior to appointment, (standard labs and 6MW). Nuc med study ordered by Dr Carrillo was moved to 1200 on 9/11 from 9/10 for coordination of care. Patient alerted to shuttle system between [facility] and Hospital. Provided patient with contact information in the event he has any additional questions or concerns prior to appointment.

## 2018-09-06 NOTE — MR AVS SNAPSHOT
After Visit Summary   9/6/2018    Zac Peterson    MRN: [id number]           Patient Information     Date Of Birth          1960        Visit Information        Provider Department      9/6/2018 10:30 AM Elsie Carrillo MD [facility]        Today's Diagnoses     Centrilobular emphysema (H)    -  1    Hypoxia           Follow-ups after your visit        Follow-up notes from your care team     Return in about 3 months (around 12/6/2018).      Your next 10 appointments already scheduled     Sep 10, 2018  2:00 PM CDT   NM LUNG SCAN VENTILATION AND PERFUSION with [facility]   [facility], [place], Nuclear Medicine ([facility], [facility])    [address] [address] [phone]           Please bring a list of your medicines to the exam. (Include vitamins, minerals and over-the-counter drugs.) You should wear comfortable clothes. Leave your valuables at home. Please bring related prior results and films.  Tell your doctor:   If you are breastfeeding or may be pregnant.   If you have had a barium test within the past few days. Barium may change the results of certain exams.   If you think you may need sedation (medicine to help you relax).  You may eat and drink as normal.  Please call your Imaging Department at your exam site with any questions.            Dec 10, 2018 11:00 AM CST   (Arrive by 10:45 AM)   Return Visit with MD VERENICE [facility] ([facility])    [address]  [address] [place] [address] [phone]              Future tests that were ordered for you today     Open Future Orders        Priority Expected Expires Ordered    NM Lung Scan Ventilation and Perfusion Routine  9/6/2019 9/6/2018            Who to contact     If you have questions or need follow up information about today's clinic visit or your schedule  "please contact [facility] directly at [phone].  Normal or non-critical lab and imaging results will be communicated to you by MyChart, letter or phone within 4 business days after the clinic has received the results. If you do not hear from us within 7 days, please contact the clinic through [employer]ev[employer] or phone. If you have a critical or abnormal lab result, we will notify you by phone as soon as possible.  Submit refill requests through [email or web address] or call your pharmacy and they will forward the refill request to us. Please allow 3 business days for your refill to be completed.          Additional Information About Your Visit        [email or web address] Information     [email or web address] gives you secure access to your electronic health record. If you see a primary care provider, you can also send messages to your care team and make appointments. If you have questions, please call your primary care clinic.  If you do not have a primary care provider, please call [phone] and they will assist you.        Care EveryWhere ID     This is your Care EveryWhere ID. This could be used by other organizations to access your [facility] medical records  [id number]        Your Vitals Were     Pulse Respirations Height Pulse Oximetry BMI (Body Mass Index)       95 17 1.778 m (5' 10\") 97% 16.93 kg/m2        Blood Pressure from Last 3 Encounters:   09/06/18 106/71   06/07/18 94/68    Weight from Last 3 Encounters:   09/06/18 53.5 kg (118 lb)   07/10/18 50.8 kg (112 lb)   06/07/18 51.2 kg (112 lb 12.8 oz)                 Today's Medication Changes          These changes are accurate as of 9/6/18 11:26 AM.  If you have any questions, ask your nurse or doctor.               These medicines have changed or have updated prescriptions.        Dose/Directions    * albuterol (2.5 MG/3ML) 0.083% neb solution   This may have changed:    - Another medication with the same name was added. Make sure you understand how and when to " take each.  - Another medication with the same name was removed. Continue taking this medication, and follow the directions you see here.   Changed by:  Elsie Carrillo MD        Dose:  2.5 mg   2.5 mg by Intrapulmonary route   Refills:  0       * albuterol 108 (90 Base) MCG/ACT inhaler   Commonly known as:  PROAIR HFA/PROVENTIL HFA/VENTOLIN HFA   This may have changed:  You were already taking a medication with the same name, and this prescription was added. Make sure you understand how and when to take each.   Used for:  Centrilobular emphysema (H), Hypoxia   Replaces:  albuterol 1.25 MG/3ML nebulizer solution   Changed by:  Elsie Carrillo MD        Dose:  2 puff   Inhale 2 puffs into the lungs every 6 hours as needed for shortness of breath / dyspnea or wheezing   Quantity:  3 Inhaler   Refills:  1       azithromycin 250 MG tablet   Commonly known as:  ZITHROMAX   This may have changed:  when to take this   Used for:  COPD, very severe (H)        Dose:  250 mg   Take 1 tablet (250 mg) by mouth daily   Quantity:  30 tablet   Refills:  3       budesonide-formoterol 160-4.5 MCG/ACT Inhaler   Commonly known as:  SYMBICORT   This may have changed:  when to take this   Used for:  Centrilobular emphysema (H), Hypoxia   Changed by:  Elsie Carrillo MD        Dose:  2 puff   Inhale 2 puffs into the lungs 2 times daily   Quantity:  3 Inhaler   Refills:  3       tiotropium 18 MCG capsule   Commonly known as:  SPIRIVA HANDIHALER   This may have changed:  when to take this   Used for:  Centrilobular emphysema (H), Hypoxia   Changed by:  Elsie Carrillo MD        Dose:  18 mcg   Inhale 1 capsule (18 mcg) into the lungs daily   Quantity:  30 capsule   Refills:  3       * Notice:  This list has 2 medication(s) that are the same as other medications prescribed for you. Read the directions carefully, and ask your doctor or other care provider to review them with you.      Stop taking these medicines if you haven't  already. Please contact your care team if you have questions.     albuterol 1.25 MG/3ML nebulizer solution   Commonly known as:  ACCUNEB   Replaced by:  albuterol 108 (90 Base) MCG/ACT inhaler   You also have another medication with the same name that you need to continue taking as instructed.   Stopped by:  Elsie Carrillo MD           HYDROcodone-acetaminophen 7.5-325 MG per tablet   Commonly known as:  NORCO   Stopped by:  Elsie Carrillo MD                Where to get your medicines      These medications were sent to [facility] - [place], [place] - [address]   [address] [facility] [address]     Phone:  [phone]     albuterol 108 (90 Base) MCG/ACT inhaler    budesonide-formoterol 160-4.5 MCG/ACT Inhaler    tiotropium 18 MCG capsule                Primary Care Provider Office Phone # Fax #    Radhames Bowser  [phone]       [facility] [address] [facility] [address]        Equal Access to Services     [facility]: Hadii nydia hernadez hadasho Soomaali, waaxda luqadaha, qaybta kaalmada adeegyaberenice, john de paz . So [facility] [phone].    ATENCIÓN: Si habla español, tiene a haji disposición servicios gratuitos de asistencia lingüística. Llame al [phone].    We comply with applicable federal civil rights laws and [place] laws. We do not discriminate on the basis of race, color, national origin, age, disability, sex, sexual orientation, or gender identity.            Thank you!     Thank you for choosing [facility]  for your care. Our goal is always to provide you with excellent care. Hearing back from our patients is one way we can continue to improve our services. Please take a few minutes to complete the written survey that you may receive in the mail after your visit with us. Thank you!             Your Updated Medication List - Protect others around you: Learn how to safely use,  store and throw away your medicines at www.disposemymeds.org.          This list is accurate as of 9/6/18 11:26 AM.  Always use your most recent med list.                   Brand Name Dispense Instructions for use Diagnosis    * albuterol (2.5 MG/3ML) 0.083% neb solution      2.5 mg by Intrapulmonary route        * albuterol 108 (90 Base) MCG/ACT inhaler    PROAIR HFA/PROVENTIL HFA/VENTOLIN HFA    3 Inhaler    Inhale 2 puffs into the lungs every 6 hours as needed for shortness of breath / dyspnea or wheezing    Centrilobular emphysema (H), Hypoxia       aspirin 325 MG tablet      Take 325 mg by mouth        azithromycin 250 MG tablet    ZITHROMAX    30 tablet    Take 1 tablet (250 mg) by mouth daily    COPD, very severe (H)       budesonide-formoterol 160-4.5 MCG/ACT Inhaler    SYMBICORT    3 Inhaler    Inhale 2 puffs into the lungs 2 times daily    Centrilobular emphysema (H), Hypoxia       fludrocortisone 0.1 MG tablet    FLORINEF     Take 0.1 mg by mouth        ibuprofen 200 MG tablet    ADVIL/MOTRIN     Take 200-600 mg by mouth        predniSONE 5 MG tablet    DELTASONE     10 mg daily        tiotropium 18 MCG capsule    SPIRIVA HANDIHALER    30 capsule    Inhale 1 capsule (18 mcg) into the lungs daily    Centrilobular emphysema (H), Hypoxia       traMADol 50 MG tablet    ULTRAM     Take 50 mg by mouth        * Notice:  This list has 2 medication(s) that are the same as other medications prescribed for you. Read the directions carefully, and ask your doctor or other care provider to review them with you.

## 2018-09-06 NOTE — LETTER
9/6/2018     RE: Zac Peterson  [address] [facility] [address]     Dear Colleague,    Thank you for referring your patient, Zac Peterson, to the [facility] at [facility]. Please see a copy of my visit note below.    Pulmonary Clinic Return Visit  HPI:  Zac Peterson is a 57 year old year old male with PMH of COPD on home O2, brain aneurysms s/p stenting, and hypotension on fludrocortisone who returns to clinic for follow up.  To briefly review, he was diagnosed of COPD around 2004/2005 and has been maintained on Spiriva, high-dose Symbicort, albuterol, and chronic prednisone. At our last visit we started MWF azithromycin.  He returns to clinic today for follow-up.  At his last appointment we recommended a referral for transplant consideration.  It seems that this is still an process although concerns were expressed regarding his low BMI.  He denies any flares or hospitalizations since we last visited.  His prednisone was recently increased to 10 mg daily in an effort to increase his appetite to promote weight gain.  He plans to follow-up with his primary care provider tomorrow and inquire as to a prescription for an appetite stimulant.  He has met with the dietitian and is not taking Ensure 3 times daily.  At present he is able to walk about  feet before he would need to stop and rest.  He is using albuterol 3-4 times a day for shortness of breath.  He remains mostly homebound due to severity of his symptoms. If leaving house will wear oxygen (3L) and also wears 2L to sleep. Becomes short of breath with dressing, showering, and eating. Is having increased episodes of anxiety related to dyspnea.    Smoked for 42 years, 1 ppd, before quitting just over one year ago.         Medications:  Albuterol, prednisone, Symbicort, Spiriva, fludrocortisone, tramadol, ASA    Allergies: No known allergies    Medical and Surgical History:  MI  "in   Brain aneurysm with stenting  Surgery to feet  Hernia repair      Family History:  Parkinsonism in mother  Both parents     Social History     Marital status: Single     Spouse name: N/A     Number of children: N/A     Years of education: N/A     Occupational History     On [employer], prior  and      Social History Main Topics     Smoking status: Former     Smokeless tobacco: Not on file     Alcohol use Not on file     Drug use: Not on file     Sexual activity: Not on file     Review of Systems:  A complete ROS was otherwise negative except as noted in the HPI.    Exam:   /71  Pulse 95  Resp 17  Ht 1.778 m (5' 10\")  Wt 53.5 kg (118 lb)  SpO2 97%  BMI 16.93 kg/m2  GENERAL APPEARANCE: Cachectic frail male, alert, no apparent distress  HEENT: PERRL, face symmetric, temporal wasting, oral mucosa without lesions, NC in place  NECK: Supple, no masses, no thyromegaly  LYMPHATICS: No significant axillary, cervical, or supraclavicular nodes  RESP: Breath sounds diminished particularly in bases, no wheezing or crackles, non-labored speech on 3L  CV: Normal S1, S2, regular rhythm and rate without murmur  ABDOMEN:  Bowel sounds normal, soft, nontender, no HSM or masses  MS: Clubbing of fingers, no edema  SKIN: No rash on limited exam  NEURO: Mentation intact, speech normal, normal gait  PSYCH: Stable mood    Assessment and Plan:  Zac Peterson is a 57 year old year old male with PMH of COPD on home O2, brain aneurysms s/p stenting, and hypotension on fludrocortisone who presents to pulmonary clinic today for follow up of COPD.  Since our last visit, he has remained relatively stable on Spiriva, Symbicort, Albuterol, MWF Azithromycin, daily prednisone, and home oxygen. Prednisone has been recently increased to 10 mg daily for appetite stimulation.   I would not recommend any changes to his inhaler regimen at this time. He remains interested in moving foward in the transplant evaluation process " which is very reasonable.  I will follow up with the transplant team to see where things are at. If he is not a transplant candidate, he could still be a candidate for LVRS.  We will obtain a VQ scan to better assess lung function and have this reviewed by our IP/CVTS group to better determine candidacy.  If he is not a candidate for either transplant or LVRS, then palliative care referral would be very reasonable to help with symptom management and transitions toward end of life cares.    The above findings and plan were discussed with Mr. Peterson who voiced understanding.  Questions and concerns were answered to his satisfaction.      He would be a candidate for Prevnar and Pneumovax if not already received.    Return to clinic 3 months.    Manju Carrillo MD  Pulmonary and Critical Care Medicine    I spent a total of 30 minutes face to face with Zac Peterson during today's office visit. Over 50% of this time was spent counseling the patient and/or coordinating care regarding their pulmonary disease.

## 2018-09-10 ENCOUNTER — TELEPHONE (OUTPATIENT)
Dept: TRANSPLANT | Facility: CLINIC | Age: 58
End: 2018-09-10

## 2018-09-10 NOTE — TELEPHONE ENCOUNTER
"Patient called to cancel his appointment with transplant pulmonologist. Patient reported he did not want to \"waste doctor's\" time if he would not be a transplant candidate due to low BMI. Patient's current BMI 16.9 would need to be 18 to be evaluated and listed for transplant. Encouraged patient to reschedule with transplant pulmonologist in a few months. Discussed goal of transplant new patient visit is to educate patient on pro/con of transplant as well as create a working plan moving toward transplant. Patient reported he is working with a nutritionist to gain weight. Plan made to follow up with patient in 2-3 months and discuss progress. Patient also declining completing Nuc Med study ordered by Dr Womack, reporting he would complete in the future. Message sent to Dr Carrillo.         "

## 2018-09-10 NOTE — Clinical Note
Desiree - Patient called and would like to cancel all appointments schedule tomorrow 9/11. Patient would NOT like to reschedule at this time. - Jonathan

## 2018-09-10 NOTE — Clinical Note
Dr Carrillo - Patient cancelled transplant new patient visit for 9/11. He was to complete nuc med study for you on 9/11 as well. Declined testing, patient requesting to complete in the future. Should I reschedule to be completed 12/10 when hs is in clinic to see you for a follow up? - Jonathan Navarrete

## 2018-09-13 NOTE — TELEPHONE ENCOUNTER
Per coord instructions to schedule lung scan on Mon, Dec 10 with [employer] appt, mailing revised schedule

## 2018-09-19 DIAGNOSIS — R09.02 HYPOXEMIA: ICD-10-CM

## 2018-09-19 DIAGNOSIS — J43.2 CENTRILOBULAR EMPHYSEMA (H): Primary | ICD-10-CM

## 2018-12-10 ENCOUNTER — TELEPHONE (OUTPATIENT)
Dept: TRANSPLANT | Facility: CLINIC | Age: 58
End: 2018-12-10

## 2018-12-10 NOTE — TELEPHONE ENCOUNTER
Patient Call: General  Route to LPN    Reason for call: Patient called has an 1045 appointment re referral  States is to sick to come in     Call back needed? Yes    Return Call Needed  Same as documented in contacts section  When to return call?: Same day: Route High Priority

## 2018-12-10 NOTE — Clinical Note
Desiree- Please schedule this patient with Dr Davies Feb 5, 2018. He will need PFT (gabby only), 6MW, and labs prior. Orders for testing in separate order.

## 2018-12-17 DIAGNOSIS — Z76.82 ORGAN TRANSPLANT CANDIDATE: ICD-10-CM

## 2018-12-17 DIAGNOSIS — J44.9 COPD (CHRONIC OBSTRUCTIVE PULMONARY DISEASE) (H): Primary | ICD-10-CM

## 2018-12-17 DIAGNOSIS — Z72.0 NICOTINE ABUSE: ICD-10-CM

## 2018-12-17 NOTE — TELEPHONE ENCOUNTER
Patient called transplant line to cancel general pulmonary appointments with Dr Carrillo scheduled on 12/10. Transplant  called patient to reschedule appointments, multiple attempts made.  able to get through to patient today 12/17/2018.      Called patient today to clarify his appointment was not with a transplant pulmonologist and provide general pulmonary office information. Patient reported he did not know Dr Carrillo was not a transplant pulmonologist. Reminded patient  he was going to see Dr Carrillo to discuss lung reduction surgery, since he did not want to move forward with transplant. Patient call on 9/10 to cancel his transplant new patient visit appointment with Dr Wilkerson. Patient declining moving forward with transplant at that time. Patient reported his regular pulmonologist Dr Lennox told him he wasn't a candidate for lung reduction. Patient became frustrated he was scheduled with Dr Carrillo. Reviewed our conversation in September, where patient declined to move forward with transplant. Reminded patient he was encouraged during that conversation to follow up with Dr Carrillo in 3 months, thus was scheduled on 12/10 to follow up with her. Clarified he was re-scheduled with Dr Carrillo because he had never called transplant coordinator back to let her know he had changed his mind regarding moving forward with transplant.     Informed patient he would need to complete 6 minute walk, spirometry, and labs prior to his new patient appointment. Patient became frustrated reported he could never complete all testing in one day. Provided patient with background regarding transplant, noted lung transplant evaluation would require him to be her undergoing tests all day for one week. Discussed patient's current level of conditioning and encouraged him to try reestablish care at pulmonary rehab to work on physical conditioning.     Current plan is to schedule patient for PFT/6MW/Labs  prior to appointment with Dr Davies 2/5/2018. Noted patient could complete testing on another day at [facility] or  with Dr Lennox if he preferred. Instructed patient to call scheduling in future if he wanted to change pre appointment testing.

## 2018-12-17 NOTE — Clinical Note
Orders for pre appointment testing with Davies on 2/5/2018. You can cancel Gerardo's appointment. He was following up with her as he thought she was a transplant pulmonologist.

## 2019-01-29 ENCOUNTER — TELEPHONE (OUTPATIENT)
Dept: PULMONOLOGY | Facility: CLINIC | Age: 59
End: 2019-01-29

## 2019-01-29 NOTE — TELEPHONE ENCOUNTER
Patient left a voice mail with transplant coordinator asking for a call back to schedule an appointment for a lung transplant evaluation. Contacted patient back. Left voice mail confirming upcoming appointment was already scheduled for 2/4/2019 with Dr Davies at [address], Labs and pulmonary testing prior. Will have [facility] Admin call patient to confirm address and re-send schedule letter sent to patient in December when appointment was scheduled.

## 2019-01-31 ENCOUNTER — TELEPHONE (OUTPATIENT)
Dept: PULMONOLOGY | Facility: CLINIC | Age: 59
End: 2019-01-31

## 2019-01-31 NOTE — TELEPHONE ENCOUNTER
Returned patient's call confirming he did have a NPT appointment with Dr Davies. Transplant plant  contacted patient to confirm address and sent patient a copy of his clinic appointment for 2/5/2019 100 with PFT/6MW/ Labs prior. Provided transplant coordinator contact information and encouraged to call back with any questions.

## 2019-02-05 ENCOUNTER — OFFICE VISIT (OUTPATIENT)
Dept: TRANSPLANT | Facility: CLINIC | Age: 59
End: 2019-02-05
Attending: INTERNAL MEDICINE
Payer: COMMERCIAL

## 2019-02-05 VITALS
BODY MASS INDEX: 17.33 KG/M2 | HEART RATE: 81 BPM | WEIGHT: 117 LBS | HEIGHT: 69 IN | OXYGEN SATURATION: 98 % | TEMPERATURE: 97.5 F | DIASTOLIC BLOOD PRESSURE: 68 MMHG | SYSTOLIC BLOOD PRESSURE: 103 MMHG

## 2019-02-05 DIAGNOSIS — J44.9 COPD (CHRONIC OBSTRUCTIVE PULMONARY DISEASE) (H): ICD-10-CM

## 2019-02-05 DIAGNOSIS — J44.9 COPD, VERY SEVERE (H): Primary | ICD-10-CM

## 2019-02-05 DIAGNOSIS — Z72.0 NICOTINE ABUSE: ICD-10-CM

## 2019-02-05 DIAGNOSIS — Z76.82 ORGAN TRANSPLANT CANDIDATE: ICD-10-CM

## 2019-02-05 LAB
6 MIN WALK (FT): 935 FT
6 MIN WALK (M): 285 M
ALBUMIN SERPL-MCNC: 3.4 G/DL (ref 3.4–5)
ALP SERPL-CCNC: 47 U/L (ref 40–150)
ALT SERPL W P-5'-P-CCNC: 15 U/L (ref 0–70)
ANION GAP SERPL CALCULATED.3IONS-SCNC: 4 MMOL/L (ref 3–14)
AST SERPL W P-5'-P-CCNC: 9 U/L (ref 0–45)
BASE EXCESS BLDV CALC-SCNC: 1.7 MMOL/L
BILIRUB SERPL-MCNC: 0.5 MG/DL (ref 0.2–1.3)
BUN SERPL-MCNC: 26 MG/DL (ref 7–30)
CALCIUM SERPL-MCNC: 8.5 MG/DL (ref 8.5–10.1)
CHLORIDE SERPL-SCNC: 108 MMOL/L (ref 94–109)
CO2 SERPL-SCNC: 28 MMOL/L (ref 20–32)
CREAT SERPL-MCNC: 1.04 MG/DL (ref 0.66–1.25)
ERYTHROCYTE [DISTWIDTH] IN BLOOD BY AUTOMATED COUNT: 11.9 % (ref 10–15)
EXPTIME-PRE: 9.44 SEC
FEF2575-%PRED-PRE: 12 %
FEF2575-PRE: 0.38 L/SEC
FEF2575-PRED: 2.98 L/SEC
FEFMAX-%PRED-PRE: 37 %
FEFMAX-PRE: 3.39 L/SEC
FEFMAX-PRED: 9.07 L/SEC
FEV1-%PRED-PRE: 35 %
FEV1-PRE: 1.24 L
FEV1FEV6-PRE: 52 %
FEV1FEV6-PRED: 79 %
FEV1FVC-PRE: 45 %
FEV1FVC-PRED: 78 %
FIFMAX-PRE: 2.53 L/SEC
FVC-%PRED-PRE: 60 %
FVC-PRE: 2.73 L
FVC-PRED: 4.52 L
GFR SERPL CREATININE-BSD FRML MDRD: 78 ML/MIN/{1.73_M2}
GLUCOSE SERPL-MCNC: 102 MG/DL (ref 70–99)
HCO3 BLDV-SCNC: 28 MMOL/L (ref 21–28)
HCT VFR BLD AUTO: 42.8 % (ref 40–53)
HGB BLD-MCNC: 14 G/DL (ref 13.3–17.7)
MCH RBC QN AUTO: 30 PG (ref 26.5–33)
MCHC RBC AUTO-ENTMCNC: 32.7 G/DL (ref 31.5–36.5)
MCV RBC AUTO: 92 FL (ref 78–100)
O2/TOTAL GAS SETTING VFR VENT: ABNORMAL %
PCO2 BLDV: 52 MM HG (ref 40–50)
PH BLDV: 7.35 PH (ref 7.32–7.43)
PLATELET # BLD AUTO: 240 10E9/L (ref 150–450)
PO2 BLDV: 23 MM HG (ref 25–47)
POTASSIUM SERPL-SCNC: 3.5 MMOL/L (ref 3.4–5.3)
PROT SERPL-MCNC: 6.4 G/DL (ref 6.8–8.8)
RBC # BLD AUTO: 4.67 10E12/L (ref 4.4–5.9)
SODIUM SERPL-SCNC: 140 MMOL/L (ref 133–144)
WBC # BLD AUTO: 11.4 10E9/L (ref 4–11)

## 2019-02-05 PROCEDURE — 85027 COMPLETE CBC AUTOMATED: CPT | Performed by: INTERNAL MEDICINE

## 2019-02-05 PROCEDURE — 80307 DRUG TEST PRSMV CHEM ANLYZR: CPT | Performed by: INTERNAL MEDICINE

## 2019-02-05 PROCEDURE — 82803 BLOOD GASES ANY COMBINATION: CPT | Performed by: INTERNAL MEDICINE

## 2019-02-05 PROCEDURE — 80053 COMPREHEN METABOLIC PANEL: CPT | Performed by: INTERNAL MEDICINE

## 2019-02-05 PROCEDURE — 36415 COLL VENOUS BLD VENIPUNCTURE: CPT | Performed by: INTERNAL MEDICINE

## 2019-02-05 RX ORDER — OXYCODONE AND ACETAMINOPHEN 5; 325 MG/1; MG/1
1 TABLET ORAL
COMMUNITY
Start: 2018-12-31

## 2019-02-05 RX ORDER — OXYCODONE HYDROCHLORIDE 5 MG/1
TABLET ORAL
COMMUNITY
Start: 2018-10-01

## 2019-02-05 ASSESSMENT — MIFFLIN-ST. JEOR: SCORE: 1336.09

## 2019-02-05 ASSESSMENT — PAIN SCALES - GENERAL: PAINLEVEL: NO PAIN (0)
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
## 2019-02-05 NOTE — NURSING NOTE
"Chief Complaint   Patient presents with     Consult     Pre lung eval     /68   Pulse 81   Temp 97.5  F (36.4  C) (Oral)   Ht 1.753 m (5' 9\")   Wt 53.1 kg (117 lb)   SpO2 98%   BMI 17.28 kg/m    Alexandra Orellana CMA    "
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
## 2019-02-10 LAB
COTININE UR QL CFM: 615 NG/ML
COTININE UR QL: NORMAL NG/ML
DRUG SCREEN COMMENT UR-IMP: NORMAL
NICOTINE UR QL CFM: 430 NG/ML
NICOTINE+COTININE UR QL CFM: 1045 NG/ML

## 2019-05-10 ENCOUNTER — TELEPHONE (OUTPATIENT)
Dept: TRANSPLANT | Facility: CLINIC | Age: 59
End: 2019-05-10

## 2019-05-10 NOTE — TELEPHONE ENCOUNTER
Returned call to Brooke Suarez, Nurse  from patient;s insurance company to provide a status update on patient';s lung transplant referral. Patient seen in clinic by Dr Davies on 2/5/2019. Patient deemed not a candidate for lung transplant at this time due to current low BMI and positive nicotine/cotinine testing. Patient encouraged to gain weight to maintain a BMI greater than 18 with goal BMI of 20. Patients weight on 2/5/2019 117 lbs (BMI 17.3) and 115 lbs (17.2) at follow up appointment with local pulmonologist on 3/1/2019.  Patient also educated he can not be a candidate for lung transplant until he has achieved a minimum 6 months free of all nicotine products to include cigarettes, cigars, e-cigarettes, gum, lozenges, patch or nicotrol inhaler.  Patient tested for nicotine/cotinine 2/5/2019. Results positive (1045 ng/ml) with concentration greater than 200 ng/ml indicates active use of tobacco or tobacco cessation products.     Due to lack of increase in weight will close referral at this time, and instruct patient to have local pulmonologist re-refer him when he has a quit all nicotine products for a minimum of six months and has maintained a weight with BMI greater than 18.

## 2019-05-10 NOTE — LETTER
May 10, 2019  From:  [facility]   To: Mr. bucky Peterson  [address] [facility] [address]      Dear Zac Peterson,    Thank you for considering the [facility] Program. You were referred to our program for consideration of lung transplant evaluation. We have reviewed your records and you unfortunately do not meet our criteria for lung transplant evaluation.  Although your lung disease may be severe enough to consider transplantation as an option, our opinion is that the following condition(s) would put your health and survival at great risk after lung transplantation.     At the time of your new patient visit with Dr Davies on 2/5/2019, you are still using nicotine products (including smoking, chewing tobacco, nicotine gum, nicotine patch, or e-cigarettes) or have used them recently.  We require candidates to be six months free of all nicotine products to be evaluated for lung transplantation.  Smoking after lung transplantation is not safe, so we must insist that potential lung recipients are completely nicotine-free beforehand.  If you need help to quit smoking-please talk to your Primary Care Provider about what resources would be best for you.    Also, low Body Mass Index or BMI, is an important risk factor for poor outcomes after lung transplantation. At our center our recommended goal BMI for transplantation is between 18 and 30.  Once you reach the weight of 123 lbs  You may potentially be a candidate for lung transplant evaluation. We will not put you on the lung transplant list or keep you on the lung transplant list if your BMI is less than 18.  If you need advice or information regarding the best options to help you gain weight, please talk to your Primary Care Provider.    Though you do not meet our program s criteria for lung transplantation, we will be glad to meet with you in our clinic to explore other treatment options for your lung disease.       Thank you for considering the [facility] Transplant Program for your health care needs. Please feel free to contact us at [phone] if you would like to discuss our decision or with any concerns.     Sincerely,    Les Villarreal MD,  Medical Director, Lung Transplantation  [facility]      CC : Radhames Bowser DO (PCP), Shelley M Lennox MD

## 2019-07-29 ENCOUNTER — TELEPHONE (OUTPATIENT)
Dept: TRANSPLANT | Facility: CLINIC | Age: 59
End: 2019-07-29

## 2019-07-29 DIAGNOSIS — J44.9 COPD, VERY SEVERE (H): ICD-10-CM

## 2019-07-29 DIAGNOSIS — J44.9 COPD (CHRONIC OBSTRUCTIVE PULMONARY DISEASE) (H): Primary | ICD-10-CM

## 2019-07-29 DIAGNOSIS — Z72.0 NICOTINE ABUSE: ICD-10-CM

## 2019-07-29 DIAGNOSIS — Z76.82 LUNG TRANSPLANT CANDIDATE: ICD-10-CM

## 2019-07-29 NOTE — TELEPHONE ENCOUNTER
Called patient to discuss upcoming appointment 8/5/2019. Patient last seen approximately six months ago for a New Patient visit with Dr Davies. Determined at that time patient was not a candidate due to current smoking and BMI less than 18.     Patient noted today he was not sure his current weight. Appointment on 6/21/2019 weight recorded as 114 lbs (BMI 17.3). Informed patient he would need to have and sustain a weight greater than 119 for BMI to be 18.     Discussed smoking cessation. Patient reported he had not smoked for more than six months. Nicotine/cotinine testing on 2/5/2019 positive for active nicotine use. Will test again at upcoming appointment.     Patient compete PFTs at last visit with Dr Lennox, will request full record as unable to view in Care Everywhere.     Patient is scheduled for RTC visit with lab appointment and 6MW on 8/5/2019. To be seen by Dr Davies, transplant pulmonologist.

## 2020-03-11 ENCOUNTER — HEALTH MAINTENANCE LETTER (OUTPATIENT)
Age: 60
End: 2020-03-11

## 2021-01-03 ENCOUNTER — HEALTH MAINTENANCE LETTER (OUTPATIENT)
Age: 61
End: 2021-01-03

## 2021-04-25 ENCOUNTER — HEALTH MAINTENANCE LETTER (OUTPATIENT)
Age: 61
End: 2021-04-25

## 2021-10-10 ENCOUNTER — HEALTH MAINTENANCE LETTER (OUTPATIENT)
Age: 61
End: 2021-10-10

## 2022-05-21 ENCOUNTER — HEALTH MAINTENANCE LETTER (OUTPATIENT)
Age: 62
End: 2022-05-21

## 2022-09-18 ENCOUNTER — HEALTH MAINTENANCE LETTER (OUTPATIENT)
Age: 62
End: 2022-09-18

## 2023-06-04 ENCOUNTER — HEALTH MAINTENANCE LETTER (OUTPATIENT)
Age: 63
End: 2023-06-04

## (undated) RX ORDER — ALBUTEROL SULFATE 0.83 MG/ML
SOLUTION RESPIRATORY (INHALATION)
Status: DISPENSED
Start: 2018-06-07